# Patient Record
Sex: MALE | Employment: OTHER | URBAN - METROPOLITAN AREA
[De-identification: names, ages, dates, MRNs, and addresses within clinical notes are randomized per-mention and may not be internally consistent; named-entity substitution may affect disease eponyms.]

---

## 2018-09-14 RX ORDER — VITAMIN E 268 MG
400 CAPSULE ORAL DAILY
COMMUNITY

## 2018-09-14 RX ORDER — POLYETHYLENE GLYCOL 3350 17 G/17G
17 POWDER, FOR SOLUTION ORAL DAILY
COMMUNITY

## 2018-09-14 RX ORDER — GUAIFENESIN 1200 MG
325 TABLET, EXTENDED RELEASE 12 HR ORAL AS NEEDED
COMMUNITY

## 2018-09-14 RX ORDER — LEVOTHYROXINE SODIUM 75 UG/1
75 TABLET ORAL
COMMUNITY

## 2018-09-14 RX ORDER — LORAZEPAM 0.5 MG/1
0.5 TABLET ORAL
COMMUNITY

## 2018-09-14 RX ORDER — RANITIDINE HCL 75 MG
75 TABLET ORAL
COMMUNITY

## 2018-09-15 ENCOUNTER — ANESTHESIA EVENT (OUTPATIENT)
Dept: ENDOSCOPY | Age: 83
End: 2018-09-15
Payer: MEDICARE

## 2018-09-15 NOTE — ANESTHESIA PREPROCEDURE EVALUATION
Anesthetic History No history of anesthetic complications Review of Systems / Medical History Patient summary reviewed, nursing notes reviewed and pertinent labs reviewed Pulmonary Sleep apnea: No treatment Neuro/Psych Psychiatric history Cardiovascular Dysrhythmias : atrial fibrillation Hyperlipidemia GI/Hepatic/Renal 
  
GERD Endo/Other Hypothyroidism: well controlled Arthritis and cancer Other Findings Physical Exam 
 
Airway Mallampati: II 
TM Distance: > 6 cm Neck ROM: normal range of motion Mouth opening: Normal 
 
 Cardiovascular Regular rate and rhythm,  S1 and S2 normal,  no murmur, click, rub, or gallop Dental 
No notable dental hx Pulmonary Breath sounds clear to auscultation Abdominal 
GI exam deferred Other Findings Anesthetic Plan ASA: 3 Anesthesia type: MAC Induction: Intravenous Anesthetic plan and risks discussed with: Patient

## 2018-09-17 ENCOUNTER — ANESTHESIA (OUTPATIENT)
Dept: ENDOSCOPY | Age: 83
End: 2018-09-17
Payer: MEDICARE

## 2018-09-17 ENCOUNTER — HOSPITAL ENCOUNTER (OUTPATIENT)
Age: 83
Setting detail: OUTPATIENT SURGERY
Discharge: HOME | End: 2018-09-17
Attending: INTERNAL MEDICINE | Admitting: INTERNAL MEDICINE
Payer: MEDICARE

## 2018-09-17 VITALS
OXYGEN SATURATION: 95 % | TEMPERATURE: 98 F | BODY MASS INDEX: 23.19 KG/M2 | SYSTOLIC BLOOD PRESSURE: 111 MMHG | RESPIRATION RATE: 20 BRPM | HEART RATE: 60 BPM | HEIGHT: 73 IN | DIASTOLIC BLOOD PRESSURE: 73 MMHG | WEIGHT: 175 LBS

## 2018-09-17 PROCEDURE — 88305 TISSUE EXAM BY PATHOLOGIST: CPT | Performed by: INTERNAL MEDICINE

## 2018-09-17 PROCEDURE — 77030027957 HC TBNG IRR ENDOGTR BUSS -B: Performed by: INTERNAL MEDICINE

## 2018-09-17 PROCEDURE — 76060000032 HC ANESTHESIA 0.5 TO 1 HR: Performed by: INTERNAL MEDICINE

## 2018-09-17 PROCEDURE — 76040000007: Performed by: INTERNAL MEDICINE

## 2018-09-17 PROCEDURE — 74011250636 HC RX REV CODE- 250/636

## 2018-09-17 PROCEDURE — 77030013992 HC SNR POLYP ENDOSC BSC -B: Performed by: INTERNAL MEDICINE

## 2018-09-17 PROCEDURE — 77030009426 HC FCPS BIOP ENDOSC BSC -B: Performed by: INTERNAL MEDICINE

## 2018-09-17 RX ORDER — DIPHENHYDRAMINE HYDROCHLORIDE 50 MG/ML
50 INJECTION, SOLUTION INTRAMUSCULAR; INTRAVENOUS ONCE
Status: DISCONTINUED | OUTPATIENT
Start: 2018-09-17 | End: 2018-09-17 | Stop reason: HOSPADM

## 2018-09-17 RX ORDER — SODIUM CHLORIDE 0.9 % (FLUSH) 0.9 %
5-10 SYRINGE (ML) INJECTION EVERY 8 HOURS
Status: DISCONTINUED | OUTPATIENT
Start: 2018-09-17 | End: 2018-09-17 | Stop reason: HOSPADM

## 2018-09-17 RX ORDER — SODIUM CHLORIDE 9 MG/ML
INJECTION, SOLUTION INTRAVENOUS
Status: DISCONTINUED | OUTPATIENT
Start: 2018-09-17 | End: 2018-09-17 | Stop reason: HOSPADM

## 2018-09-17 RX ORDER — LIDOCAINE HYDROCHLORIDE 20 MG/ML
INJECTION, SOLUTION EPIDURAL; INFILTRATION; INTRACAUDAL; PERINEURAL AS NEEDED
Status: DISCONTINUED | OUTPATIENT
Start: 2018-09-17 | End: 2018-09-17 | Stop reason: HOSPADM

## 2018-09-17 RX ORDER — FENTANYL CITRATE 50 UG/ML
100 INJECTION, SOLUTION INTRAMUSCULAR; INTRAVENOUS
Status: DISCONTINUED | OUTPATIENT
Start: 2018-09-17 | End: 2018-09-17 | Stop reason: HOSPADM

## 2018-09-17 RX ORDER — NALOXONE HYDROCHLORIDE 0.4 MG/ML
0.4 INJECTION, SOLUTION INTRAMUSCULAR; INTRAVENOUS; SUBCUTANEOUS
Status: DISCONTINUED | OUTPATIENT
Start: 2018-09-17 | End: 2018-09-17 | Stop reason: HOSPADM

## 2018-09-17 RX ORDER — SODIUM CHLORIDE 0.9 % (FLUSH) 0.9 %
5-10 SYRINGE (ML) INJECTION AS NEEDED
Status: DISCONTINUED | OUTPATIENT
Start: 2018-09-17 | End: 2018-09-17 | Stop reason: HOSPADM

## 2018-09-17 RX ORDER — EPINEPHRINE 0.1 MG/ML
1 INJECTION INTRACARDIAC; INTRAVENOUS
Status: DISCONTINUED | OUTPATIENT
Start: 2018-09-17 | End: 2018-09-17 | Stop reason: HOSPADM

## 2018-09-17 RX ORDER — ATROPINE SULFATE 0.1 MG/ML
0.5 INJECTION INTRAVENOUS
Status: DISCONTINUED | OUTPATIENT
Start: 2018-09-17 | End: 2018-09-17 | Stop reason: HOSPADM

## 2018-09-17 RX ORDER — FLUMAZENIL 0.1 MG/ML
0.2 INJECTION INTRAVENOUS
Status: DISCONTINUED | OUTPATIENT
Start: 2018-09-17 | End: 2018-09-17 | Stop reason: HOSPADM

## 2018-09-17 RX ORDER — MIDAZOLAM HYDROCHLORIDE 1 MG/ML
.25-1 INJECTION, SOLUTION INTRAMUSCULAR; INTRAVENOUS
Status: DISCONTINUED | OUTPATIENT
Start: 2018-09-17 | End: 2018-09-17 | Stop reason: HOSPADM

## 2018-09-17 RX ORDER — DEXTROMETHORPHAN/PSEUDOEPHED 2.5-7.5/.8
1.2 DROPS ORAL
Status: DISCONTINUED | OUTPATIENT
Start: 2018-09-17 | End: 2018-09-17 | Stop reason: HOSPADM

## 2018-09-17 RX ORDER — SODIUM CHLORIDE 9 MG/ML
100 INJECTION, SOLUTION INTRAVENOUS CONTINUOUS
Status: DISCONTINUED | OUTPATIENT
Start: 2018-09-17 | End: 2018-09-17 | Stop reason: HOSPADM

## 2018-09-17 RX ORDER — PROPOFOL 10 MG/ML
INJECTION, EMULSION INTRAVENOUS AS NEEDED
Status: DISCONTINUED | OUTPATIENT
Start: 2018-09-17 | End: 2018-09-17 | Stop reason: HOSPADM

## 2018-09-17 RX ADMIN — PROPOFOL 50 MG: 10 INJECTION, EMULSION INTRAVENOUS at 10:19

## 2018-09-17 RX ADMIN — PROPOFOL 30 MG: 10 INJECTION, EMULSION INTRAVENOUS at 10:08

## 2018-09-17 RX ADMIN — PROPOFOL 50 MG: 10 INJECTION, EMULSION INTRAVENOUS at 10:12

## 2018-09-17 RX ADMIN — PROPOFOL 50 MG: 10 INJECTION, EMULSION INTRAVENOUS at 10:16

## 2018-09-17 RX ADMIN — PROPOFOL 70 MG: 10 INJECTION, EMULSION INTRAVENOUS at 10:04

## 2018-09-17 RX ADMIN — SODIUM CHLORIDE: 9 INJECTION, SOLUTION INTRAVENOUS at 10:00

## 2018-09-17 RX ADMIN — LIDOCAINE HYDROCHLORIDE 40 MG: 20 INJECTION, SOLUTION EPIDURAL; INFILTRATION; INTRACAUDAL; PERINEURAL at 10:04

## 2018-09-17 RX ADMIN — PROPOFOL 50 MG: 10 INJECTION, EMULSION INTRAVENOUS at 10:22

## 2018-09-17 NOTE — DISCHARGE INSTRUCTIONS
[address], [address] [place] [address]    EGD/COLON DISCHARGE INSTRUCTIONS    Zuleyma Harrison  [id number]  1935    Discomfort:  Sore throat- throat lozenges or warm salt water gargle  redness at IV site- apply warm compress to area; if redness or soreness persist- contact your physician  Gaseous discomfort- walking, belching will help relieve any discomfort  You may not operate a vehicle for 12 hours  You may not engage in an occupation involving machinery or appliances for rest of today  You may not drink alcoholic beverages for at least 12 hours  Avoid making any critical decisions for at least 24 hour  DIET  You may resume your regular diet - however -  remember your colon is empty and a heavy meal will produce gas. Avoid these foods:  vegetables, fried / greasy foods, carbonated drinks    ACTIVITY  You may resume your normal daily activities   Spend the remainder of the day resting -  avoid any strenuous activity. CALL M.D. ANY SIGN OF   Increasing pain, nausea, vomiting  Abdominal distension (swelling)  New increased bleeding (oral or rectal)  Fever (chills)  Pain in chest area  Bloody discharge from nose or mouth  Shortness of breath    Follow-up Instructions:   Call Dr. Thierry Thompson for any questions or problems. Telephone # [phone]    ENDOSCOPY FINDINGS:   Your endoscopy showed suspicion for a Zenker diverticulum. We will arrange for you to have an x-ray test for this. Biopsies were taken of the esophagus. Your colonoscopy showed one polyp, which was removed. We will contact you about the pathology results and when your next colonoscopy will be due. Signed By: Marshall Bernard.  Hilda Mcclelland MD     9/17/2018  10:41 AM

## 2018-09-17 NOTE — ANESTHESIA POSTPROCEDURE EVALUATION
Post-Anesthesia Evaluation and Assessment Patient: Jason Foster MRN: [id number]  SSN: [id number] YOB: 1935  Age: [de-identified] y.o. Sex: male Cardiovascular Function/Vital Signs Visit Vitals  /73  Pulse 60  Temp 36.7 °C (98 °F)  Resp 20  
 Ht 6' 1\" (1.854 m)  Wt 79.4 kg (175 lb)  SpO2 95%  BMI 23.09 kg/m2 Patient is status post MAC anesthesia for Procedure(s): 
COLONOSCOPY 
ESOPHAGOGASTRODUODENOSCOPY (EGD) ESOPHAGOGASTRODUODENAL (EGD) BIOPSY ENDOSCOPIC POLYPECTOMY. Nausea/Vomiting: None Postoperative hydration reviewed and adequate. Pain: 
Pain Scale 1: Numeric (0 - 10) (09/17/18 1059) Pain Intensity 1: 0 (09/17/18 1059) Managed Neurological Status: At baseline Mental Status and Level of Consciousness: Arousable Pulmonary Status:  
O2 Device: Room air (09/17/18 1059) Adequate oxygenation and airway patent Complications related to anesthesia: None Post-anesthesia assessment completed. No concerns Signed By: Candido Giron MD   
 September 17, 2018

## 2018-09-17 NOTE — PROCEDURES
[address]  174 [facility], [address] [place] [address]        Colonoscopy Operative Report    Annie Johnson  [id number]  1935      Procedure Type:   Colonoscopy with polypectomy (snare cautery)     Indications:    Screening colonoscopy         Pre-operative Diagnosis: see indication above    Post-operative Diagnosis:  See findings below    :  Winford Phoenix. Cheyenne Carrasquillo MD      Referring Provider: Darinel Bobby MD      Sedation:  MAC anesthesia Propofol      Procedure Details:  After informed consent was obtained with all risks and benefits of procedure explained and preoperative exam completed, the patient was taken to the endoscopy suite and placed in the left lateral decubitus position. Upon sequential sedation as per above, a digital rectal exam was performed demonstrating internal hemorrhoids. The Olympus pediatric videocolonoscope  was inserted in the rectum and carefully advanced to the cecum, which was identified by the ileocecal valve and appendiceal orifice. The cecum was identified by the ileocecal valve and appendiceal orifice. The quality of preparation was good. The colonoscope was slowly withdrawn with careful evaluation between folds. Retroflexion in the rectum was completed . Findings:   Rectum: small internal hemorrhoids  Sigmoid: normal  Descending Colon: normal  Transverse Colon: normal  Ascending Colon: single sessile 1 cm polyp - removed with hot snare  Cecum: normal  Terminal Ileum: not intubated      Specimen Removed: 1. Ascending colon polyp    Complications: None. EBL:  None. Impression:    1. Ascending colon polyp - removed  2. Small internal hemorrhoids    Recommendations:  1. Follow up surgical pathology  2. High fiber diet education  3. Consider repeat colonoscopy in 3-5 years based on pathology results and general health at that time    Signed By: Winford Phoenix.  Cheyenne Carrasquillo MD     9/17/2018  10:47 AM

## 2018-09-17 NOTE — H&P
K[address] 
[address] History and Physical    
 
NAME:  Kendall Inman :   1935 MRN:   [id number] History of Present Illness:  Patient is a [de-identified] y.o. who is seen for colon cancer screening. Last colonoscopy was normal at age 81. He is having dysphagia. PMH: 
Past Medical History:  
Diagnosis Date  Acute nephritis AS CHILD  Arrhythmia A-FIB; TACHYCARDIA  Arthritis  Cancer ([address] BLADDER - surgery  Chronic kidney disease   
 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   
 slight - CPAP not recommended  Thyroid disease PSH: 
Past Surgical History:  
Procedure Laterality Date  HX HEENT  SEVERAL  
 ORAL SURGERY  
 HX ORTHOPAEDIC    
 liquid put in vertebra and hardened d/t incomplete vertebra/glue like substance - years later the glue like substance severed nerves - had PT - only numbness he has now is left ankle  HX OTHER SURGICAL   COLONOSCOPY  
 HX OTHER SURGICAL  1950S HYDROCELE  
 HX UROLOGICAL   CYSTOSCOPY - mulitple [address] FERTILITY SURGERY Allergies: Allergies Allergen Reactions  Avodart [Dutasteride] Other (comments) Enlarged and painful breasts.  Coffee (Coffea Arabica) Other (comments) Internal bleeding from coffee bean Home Medications: 
Prior to Admission Medications Prescriptions Last Dose Informant Patient Reported? Taking? ASPIRIN PO Not Taking at Unknown time  Yes No  
Sig: Take 81 mg by mouth every other day. LORazepam (ATIVAN) 0.5 mg tablet 9/10/2018 at Unknown time  Yes Yes Sig: Take 0.5 mg by mouth two (2) times daily as needed for Anxiety. Lactobacillus acidophilus (PROBIOTIC PO) 9/10/2018 at Unknown time  Yes Yes Sig: Take  by mouth. Takes one po once daily. acetaminophen 325 mg cap 9/10/2018 at Unknown time  Yes Yes Sig: Take 325 mg by mouth as needed. atorvastatin (LIPITOR) 20 mg tablet 9/10/2018 at Unknown time  Yes Yes Sig: Take 20 mg by mouth daily. bisacodyl 5 mg tab Unknown at Unknown time  Yes No  
Sig: Take 5 mg by mouth as needed. flecainide (TAMBOCOR) 50 mg tablet 9/10/2018 at Unknown time  Yes Yes Sig: Take 50 mg by mouth daily. levothyroxine (SYNTHROID) 75 mcg tablet 9/17/2018 at Unknown time  Yes Yes Sig: Take 75 mcg by mouth Daily (before breakfast). multivit-min/folic/vit K/lycop (MEN'S MULTIVITAMIN PO) 9/10/2018 at Unknown time  Yes Yes Sig: Take  by mouth. Takes one po once daily. polyethylene glycol (MIRALAX) 17 gram/dose powder Not Taking at Unknown time  Yes No  
Sig: Take 17 g by mouth daily. raNITIdine (ZANTAC) 75 mg tablet 9/10/2018 at Unknown time  Yes Yes Sig: Take 75 mg by mouth daily as needed for Indigestion. vitamin E (AQUA GEMS) 400 unit capsule 9/10/2018 at Unknown time  Yes Yes Sig: Take 400 Units by mouth daily. Facility-Administered Medications: None Hospital Medications: 
Current Facility-Administered Medications Medication Dose Route Frequency  0.9% sodium chloride infusion  100 mL/hr IntraVENous CONTINUOUS  
 sodium chloride (NS) flush 5-10 mL  5-10 mL IntraVENous Q8H  
 sodium chloride (NS) flush 5-10 mL  5-10 mL IntraVENous PRN  
 midazolam (VERSED) injection 0.25-10 mg  0.25-10 mg IntraVENous Multiple  fentaNYL citrate (PF) injection 100 mcg  100 mcg IntraVENous Multiple  naloxone (NARCAN) injection 0.4 mg  0.4 mg IntraVENous Multiple  flumazenil (ROMAZICON) 0.1 mg/mL injection 0.2 mg  0.2 mg IntraVENous Multiple  simethicone (MYLICON) 99YD/2.8IA oral drops 80 mg  1.2 mL Oral Multiple  diphenhydrAMINE (BENADRYL) injection 50 mg  50 mg IntraVENous ONCE  
 atropine injection 0.5 mg  0.5 mg IntraVENous ONCE PRN  
  EPINEPHrine (ADRENALIN) 0.1 mg/mL syringe 1 mg  1 mg Endoscopically ONCE PRN Social History: 
Social History Substance Use Topics  Smoking status: Former Smoker  Smokeless tobacco: Never Used Comment: quit 55-60 yrs ago or longer - smoked in college  Alcohol use Yes Comment: daily drink of some sort Family History: 
Family History Problem Relation Age of Onset  Stroke Mother TIAS  Cancer Father LUNG  
 Heart Disease Father  Alcohol abuse Father  Other Sister POLIO Review of Systems: 
 
 
Constitutional: negative fever, negative chills, negative weight loss Eyes:   negative visual changes ENT:   negative sore throat, tongue or lip swelling Respiratory:  negative cough, negative dyspnea Cards:  negative for chest pain, palpitations, lower extremity edema GI:   See HPI 
:  negative for frequency, dysuria Integument:  negative for rash and pruritus Heme:  negative for easy bruising and gum/nose bleeding Musculoskel: negative for myalgias,  back pain and muscle weakness Neuro: negative for headaches, dizziness, vertigo Psych:  negative for feelings of anxiety, depression Objective:  
Patient Vitals for the past 8 hrs: 
 BP Pulse Resp SpO2 Height Weight  
09/17/18 0950 151/85 67 23 98 % 6' 1\" (1.854 m) 79.4 kg (175 lb) EXAM:   
 NEURO-a&o HEENT-wnl LUNGS-clear COR-regular rate and rhythym ABD-soft , no tenderness, no rebound, good bs EXT-no edema Data Review No results for input(s): WBC, HGB, HCT, PLT, HGBEXT, HCTEXT, PLTEXT in the last 72 hours. No results for input(s): NA, K, CL, CO2, BUN, CREA, GLU, PHOS, CA in the last 72 hours. No results for input(s): SGOT, GPT, AP, TBIL, TP, ALB, GLOB, GGT, AML, LPSE in the last 72 hours. No lab exists for component: AMYP, HLPSE No results for input(s): INR, PTP, APTT in the last 72 hours.  
 
No lab exists for component: INREXT 
 
  
 Assessment:  
· Colon cancer screening · Dysphagia Patient Active Problem List  
Diagnosis Code  [facility] C67.9 Plan:  
· Endoscopic procedure with MAC Signed By: Ailyn Gonzales.  Yelena Wesley MD   
 9/17/2018  9:56 AM

## 2018-09-17 NOTE — IP AVS SNAPSHOT
[address] 
[phone] Patient: Fred Guillen MRN: [id number] :1935 About your hospitalization You were admitted on:  2018 You last received care in the:  [facility] ENDOSCOPY You were discharged on:  2018 Why you were hospitalized Your primary diagnosis was:  Not on File Follow-up Information None Discharge Orders None A check brenda indicates which time of day the medication should be taken. My Medications CONTINUE taking these medications Instructions Each Dose to Equal  
 Morning Noon Evening Bedtime  
 acetaminophen 325 mg Cap Your last dose was: Your next dose is: Take 325 mg by mouth as needed. 325 mg  
    
   
   
   
  
 ASPIRIN PO Your last dose was: Your next dose is: Take 81 mg by mouth every other day. 81 mg  
    
   
   
   
  
 atorvastatin 20 mg tablet Commonly known as:  LIPITOR Your last dose was: Your next dose is: Take 20 mg by mouth daily. 20 mg  
    
   
   
   
  
 bisacodyl 5 mg Tab Your last dose was: Your next dose is: Take 5 mg by mouth as needed. 5 mg  
    
   
   
   
  
 flecainide 50 mg tablet Commonly known as:  TAMBOCOR Your last dose was: Your next dose is: Take 50 mg by mouth daily. 50 mg  
    
   
   
   
  
 levothyroxine 75 mcg tablet Commonly known as:  SYNTHROID Your last dose was: Your next dose is: Take 75 mcg by mouth Daily (before breakfast). 75 mcg LORazepam 0.5 mg tablet Commonly known as:  ATIVAN Your last dose was: Your next dose is: Take 0.5 mg by mouth two (2) times daily as needed for Anxiety. 0.5 mg  MEN'S MULTIVITAMIN PO  
   
 Your last dose was: Your next dose is: Take  by mouth. Takes one po once daily. MIRALAX 17 gram/dose powder Generic drug:  polyethylene glycol Your last dose was: Your next dose is: Take 17 g by mouth daily. 17 g PROBIOTIC PO Your last dose was: Your next dose is: Take  by mouth. Takes one po once daily. raNITIdine 75 mg Tab Commonly known as:  ZANTAC Your last dose was: Your next dose is: Take 75 mg by mouth daily as needed for Indigestion. 75 mg  
    
   
   
   
  
 vitamin E 400 unit capsule Commonly known as:  [address] Your last dose was: Your next dose is: Take 400 Units by mouth daily. 400 Units Discharge Instructions [address] [place] [address] EGD/COLON DISCHARGE INSTRUCTIONS Azael Shine [id number] 
1935 Discomfort: 
Sore throat- throat lozenges or warm salt water gargle 
redness at IV site- apply warm compress to area; if redness or soreness persist- contact your physician Gaseous discomfort- walking, belching will help relieve any discomfort You may not operate a vehicle for 12 hours You may not engage in an occupation involving machinery or appliances for rest of today You may not drink alcoholic beverages for at least 12 hours Avoid making any critical decisions for at least 24 hour DIET You may resume your regular diet  however -  remember your colon is empty and a heavy meal will produce gas. Avoid these foods:  vegetables, fried / greasy foods, carbonated drinks ACTIVITY You may resume your normal daily activities Spend the remainder of the day resting -  avoid any strenuous activity. CALL M.D. ANY SIGN OF Increasing pain, nausea, vomiting Abdominal distension (swelling) New increased bleeding (oral or rectal) Fever (chills) Pain in chest area Bloody discharge from nose or mouth Shortness of breath Follow-up Instructions: 
 Call Dr. Salvador Lynn for any questions or problems. Telephone # [phone] ENDOSCOPY FINDINGS: 
 Your endoscopy showed suspicion for a Zenker diverticulum. We will arrange for you to have an x-ray test for this. Biopsies were taken of the esophagus. Your colonoscopy showed one polyp, which was removed. We will contact you about the pathology results and when your next colonoscopy will be due. Signed By: Umu Romero. Owen Juarez MD   
 9/17/2018  10:41 AM 
  
 
 
 
  
  
  
Introducing [facility]! [employer] introduces [employer] patient portal. Now you can access parts of your medical record, email your doctor's office, and request medication refills online. 1. In your internet browser, go to [email or web address] 2. Click on the First Time User? Click Here link in the Sign In box. You will see the New Member Sign Up page. 3. Enter your [employer] Access Code exactly as it appears below. You will not need to use this code after youve completed the sign-up process. If you do not sign up before the expiration date, you must request a new code. · [employer] Access Code: [email or web address] Expires: 12/16/2018 10:50 AM 
 
4. Enter the last four digits of your Social Security Number (xxxx) and Date of Birth (mm/dd/yyyy) as indicated and click Submit. You will be taken to the next sign-up page. 5. Create a Crushpatht ID. This will be your [employer] login ID and cannot be changed, so think of one that is secure and easy to remember. 6. Create a [employer] password. You can change your password at any time. 7. Enter your Password Reset Question and Answer. This can be used at a later time if you forget your password. 8. Enter your e-mail address.  You will receive e-mail notification when new information is available in [employer]. 9. Click Sign Up. You can now view and download portions of your medical record. 10. Click the Download Summary menu link to download a portable copy of your medical information. If you have questions, please visit the Frequently Asked Questions section of the [employer] website. Remember, [employer] is NOT to be used for urgent needs. For medical emergencies, dial 911. Now available from your iPhone and Android! Introducing Anibal Morejon As a [employer] patient, I wanted to make you aware of our electronic visit tool called Anibal Morejon. [employer] 24/7 allows you to connect within minutes with a medical provider 24 hours a day, seven days a week via a mobile device or tablet or logging into a secure website from your computer. You can access Anibal Morejon from anywhere in the [place]. A virtual visit might be right for you when you have a simple condition and feel like you just dont want to get out of bed, or cant get away from work for an appointment, when your regular [employer] provider is not available (evenings, weekends or holidays), or when youre out of town and need minor care. Electronic visits cost only $49 and if the [employer] 24/7 provider determines a prescription is needed to treat your condition, one can be electronically transmitted to a nearby pharmacy*. Please take a moment to enroll today if you have not already done so. The enrollment process is free and takes just a few minutes. To enroll, please download the [employer] 24/7 jemma to your tablet or phone, or visit [email or web address] to enroll on your computer. And, as an [address] [place] [address] patient with a [employer] account, the results of your visits will be scanned into your electronic medical record and your primary care provider will be able to view the scanned results. We urge you to continue to see your regular [employer] provider for your ongoing medical care. And while your primary care provider may not be the one available when you seek a [employer] virtual visit, the peace of mind you get from getting a real diagnosis real time can be priceless. For more information on [employer], view our Frequently Asked Questions (FAQs) at [email or web address]. Sincerely, 
 
Marisel Greenberg MD 
Chief Medical Officer Omer Al *:  certain medications cannot be prescribed via [employer] Providers Seen During Your Hospitalization Provider Specialty Primary office phone Jung Jameson MD Gastroenterology [phone] Your Primary Care Physician (PCP) Primary Care Physician Office Phone Office Fax Sonido Levine [phone] You are allergic to the following Allergen Reactions Avodart (Dutasteride) Other (comments) Enlarged and painful breasts. Coffee (Coffea Arabica) Other (comments) Internal bleeding from coffee bean Recent Documentation Height Weight BMI Smoking Status 1.854 m 79.4 kg 23.09 kg/m2 Former Smoker Emergency Contacts Name Discharge Info Relation Home Work Mobile Frida Rodas DISCHARGE CAREGIVER [3] Spouse [3] [phone] Patient Belongings The following personal items are in your possession at time of discharge: 
  Dental Appliances: None  Visual Aid: Glasses Please provide this summary of care documentation to your next provider. Signatures-by signing, you are acknowledging that this After Visit Summary has been reviewed with you and you have received a copy. Patient Signature:  ____________________________________________________________  Date:  ____________________________________________________________  
  
Pamela Nelson    
    
 Provider Signature:  ____________________________________________________________ Date:  ____________________________________________________________

## 2018-09-17 NOTE — PROCEDURES
[address]  174 [facility], [address]          [address] [place] [address] (EGD) Procedure Note    Renata Gonzalez  1935  [id number]      Procedure: Endoscopic Gastroduodenoscopy --diagnostic, with biopsy    Indication:  dysphagia     Pre-operative Diagnosis: see indication above    Post-operative Diagnosis: see findings below    : [facility]. Katya Briseno MD    Referring Provider:  Adi Villegas MD      Anesthesia/Sedation:  MAC anesthesia Propofol        Procedure Details     After informed consent was obtained for the procedure, with all risks and benefits of procedure explained the patient was taken to the endoscopy suite and placed in the left lateral decubitus position. Following sequential administration of sedation as per above, the endoscope was inserted into the mouth and advanced under direct vision to second portion of the duodenum. A careful inspection was made as the gastroscope was withdrawn, including a retroflexed view of the proximal stomach; findings and interventions are described below. Findings:   Esophagus: suspect Zenker diverticulum endoscopically. Additionally, there was a short < 1 cm tongue of Decker's appearing mucosa in the distal esophagus. No nodules seen with white light endoscopy and NBI. Cold biopsies taken. Stomach: normal  Duodenum: normal to second portion      Therapies:  As above    Specimens: 1. Distal esophagus         EBL: None      Complications:   None; patient tolerated the procedure well. Impression:    1. 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 Recommendations:  1. Follow up surgical pathology  2. Modified barium esophagram  3. Proceed to colonoscopy    Signed By: [facility].  Katya Briseno MD     9/17/2018  10:43 AM

## 2018-09-17 NOTE — ROUTINE PROCESS
[facility] 
1935 
[id number] Situation: 
Verbal report received from: Deya Procedure: Procedure(s): 
COLONOSCOPY 
ESOPHAGOGASTRODUODENOSCOPY (EGD) ESOPHAGOGASTRODUODENAL (EGD) BIOPSY ENDOSCOPIC POLYPECTOMY Background: 
 
Preoperative diagnosis: DYSPHAGIA, SCREENING Postoperative diagnosis: 1.- Dysphagia 2.- Colonic Polyp 3.- Internal Hemorrhoids :  Dr. Yelena Wesley Assistant(s): Endoscopy Technician-1: Tyler Friedman IV Endoscopy RN-1: Keiko Meneses RN Specimens:  
ID Type Source Tests Collected by Time Destination 1 : Distal Esophagus Biopsy Preservative   Jung Wesley MD 9/17/2018 1011 Pathology 2 : Ascending Colon Polyp Preservative   Jung Wesley MD 9/17/2018 1026 Pathology H. Pylori  no Assessment: 
Intra-procedure medications Anesthesia gave intra-procedure sedation and medications, see anesthesia flow sheet yes Intravenous fluids: NS@ [facility] Vital signs stable yes Abdominal assessment: round and soft  Yes Recommendation: 
Discharge patient per MD order yes . Return to floor na Family or Friend fam 
Permission to share finding with family or friend yes

## (undated) DEVICE — BAG SPEC BIOHZD LF 2MIL 6X10IN -- CONVERT TO ITEM 357326

## (undated) DEVICE — KENDALL RADIOLUCENT FOAM MONITORING ELECTRODE -RECTANGULAR SHAPE: Brand: KENDALL

## (undated) DEVICE — Z DISCONTINUED USE 2751540 TUBING IRRIG L10IN DISP PMP ENDOGATOR

## (undated) DEVICE — QUILTED PREMIUM COMFORT UNDERPAD,EXTRA HEAVY: Brand: WINGS

## (undated) DEVICE — CONTAINER SPEC 20 ML LID NEUT BUFF FORMALIN 10 % POLYPR STS

## (undated) DEVICE — BAG BELONG PT PERS CLEAR HANDL

## (undated) DEVICE — NEEDLE HYPO 18GA L1.5IN PNK S STL HUB POLYPR SHLD REG BVL

## (undated) DEVICE — TRAP SURG QUAD PARABOLA SLOT DSGN SFTY SCRN TRAPEASE

## (undated) DEVICE — KIT IV STRT W CHLORAPREP PD 1ML

## (undated) DEVICE — Z DISCONTINUED NO SUB IDED SET EXTN W/ 4 W STPCOCK M SPIN LOK 36IN

## (undated) DEVICE — 1200 GUARD II KIT W/5MM TUBE W/O VAC TUBE: Brand: GUARDIAN

## (undated) DEVICE — BW-412T DISP COMBO CLEANING BRUSH: Brand: SINGLE USE COMBINATION CLEANING BRUSH

## (undated) DEVICE — CATH IV AUTOGRD BC BLU 22GA 25 -- INSYTE

## (undated) DEVICE — CANN NASAL O2 CAPNOGRAPHY AD -- FILTERLINE

## (undated) DEVICE — SOLIDIFIER FLUID 3000 CC ABSORB

## (undated) DEVICE — REM POLYHESIVE ADULT PATIENT RETURN ELECTRODE: Brand: VALLEYLAB

## (undated) DEVICE — ENDO CARRY-ON PROCEDURE KIT INCLUDES ENZYMATIC SPONGE, GAUZE, BIOHAZARD LABEL, TRAY, LUBRICANT, DIRTY SCOPE LABEL, WATER LABEL, TRAY, DRAWSTRING PAD, AND DEFENDO 4-PIECE KIT.: Brand: ENDO CARRY-ON PROCEDURE KIT

## (undated) DEVICE — SNARE ENDOSCP M L240CM W27MM SHTH DIA2.4MM CHN 2.8MM OVL

## (undated) DEVICE — AIRLIFE™ U/CONNECT-IT OXYGEN TUBING 7 FEET (2.1 M) CRUSH-RESISTANT OXYGEN TUBING, VINYL TIPPED: Brand: AIRLIFE™

## (undated) DEVICE — FORCEPS BX L240CM JAW DIA2.8MM L CAP W/ NDL MIC MESH TOOTH

## (undated) DEVICE — NEONATAL-ADULT SPO2 SENSOR: Brand: NELLCOR

## (undated) DEVICE — CONNECTOR TBNG AUX H2O JET DISP FOR OLY 160/180 SER

## (undated) DEVICE — SET ADMIN 16ML TBNG L100IN 2 Y INJ SITE IV PIGGY BK DISP

## (undated) DEVICE — SYRINGE MED 20ML STD CLR PLAS LUERLOCK TIP N CTRL DISP

## (undated) DEVICE — Device

## (undated) DEVICE — WRISTBAND ID AD W1XL11.5IN RED POLY ALRG PREPRINTED PERM